# Patient Record
Sex: FEMALE | Race: WHITE | NOT HISPANIC OR LATINO
[De-identification: names, ages, dates, MRNs, and addresses within clinical notes are randomized per-mention and may not be internally consistent; named-entity substitution may affect disease eponyms.]

---

## 2017-06-22 ENCOUNTER — TRANSCRIPTION ENCOUNTER (OUTPATIENT)
Age: 42
End: 2017-06-22

## 2018-02-03 ENCOUNTER — APPOINTMENT (OUTPATIENT)
Dept: CT IMAGING | Facility: CLINIC | Age: 43
End: 2018-02-03
Payer: COMMERCIAL

## 2018-02-03 ENCOUNTER — OUTPATIENT (OUTPATIENT)
Dept: OUTPATIENT SERVICES | Facility: HOSPITAL | Age: 43
LOS: 1 days | End: 2018-02-03
Payer: COMMERCIAL

## 2018-02-03 DIAGNOSIS — Z00.8 ENCOUNTER FOR OTHER GENERAL EXAMINATION: ICD-10-CM

## 2018-02-03 PROCEDURE — 70486 CT MAXILLOFACIAL W/O DYE: CPT

## 2018-02-03 PROCEDURE — 70486 CT MAXILLOFACIAL W/O DYE: CPT | Mod: 26

## 2018-03-30 ENCOUNTER — OUTPATIENT (OUTPATIENT)
Dept: OUTPATIENT SERVICES | Facility: HOSPITAL | Age: 43
LOS: 1 days | End: 2018-03-30
Payer: COMMERCIAL

## 2018-03-30 VITALS
WEIGHT: 113.98 LBS | TEMPERATURE: 99 F | HEART RATE: 94 BPM | SYSTOLIC BLOOD PRESSURE: 97 MMHG | RESPIRATION RATE: 16 BRPM | HEIGHT: 62 IN | DIASTOLIC BLOOD PRESSURE: 67 MMHG | OXYGEN SATURATION: 98 %

## 2018-03-30 DIAGNOSIS — J32.0 CHRONIC MAXILLARY SINUSITIS: ICD-10-CM

## 2018-03-30 DIAGNOSIS — J34.3 HYPERTROPHY OF NASAL TURBINATES: ICD-10-CM

## 2018-03-30 DIAGNOSIS — J34.2 DEVIATED NASAL SEPTUM: ICD-10-CM

## 2018-03-30 DIAGNOSIS — Z01.818 ENCOUNTER FOR OTHER PREPROCEDURAL EXAMINATION: ICD-10-CM

## 2018-03-30 DIAGNOSIS — Z98.890 OTHER SPECIFIED POSTPROCEDURAL STATES: Chronic | ICD-10-CM

## 2018-03-30 LAB
APTT BLD: 29.9 SEC — SIGNIFICANT CHANGE UP (ref 27.5–37.4)
HCT VFR BLD CALC: 37.6 % — SIGNIFICANT CHANGE UP (ref 34.5–45)
HGB BLD-MCNC: 12.2 G/DL — SIGNIFICANT CHANGE UP (ref 11.5–15.5)
INR BLD: 1.01 RATIO — SIGNIFICANT CHANGE UP (ref 0.88–1.16)
MCHC RBC-ENTMCNC: 28.2 PG — SIGNIFICANT CHANGE UP (ref 27–34)
MCHC RBC-ENTMCNC: 32.4 GM/DL — SIGNIFICANT CHANGE UP (ref 32–36)
MCV RBC AUTO: 86.8 FL — SIGNIFICANT CHANGE UP (ref 80–100)
NRBC # BLD: 0 /100 WBCS — SIGNIFICANT CHANGE UP (ref 0–0)
PLATELET # BLD AUTO: 330 K/UL — SIGNIFICANT CHANGE UP (ref 150–400)
PROTHROM AB SERPL-ACNC: 11.4 SEC — SIGNIFICANT CHANGE UP (ref 10–13.1)
RBC # BLD: 4.33 M/UL — SIGNIFICANT CHANGE UP (ref 3.8–5.2)
RBC # FLD: 13.4 % — SIGNIFICANT CHANGE UP (ref 10.3–14.5)
WBC # BLD: 6.35 K/UL — SIGNIFICANT CHANGE UP (ref 3.8–10.5)
WBC # FLD AUTO: 6.35 K/UL — SIGNIFICANT CHANGE UP (ref 3.8–10.5)

## 2018-03-30 PROCEDURE — 85027 COMPLETE CBC AUTOMATED: CPT

## 2018-03-30 PROCEDURE — G0463: CPT

## 2018-03-30 PROCEDURE — 85610 PROTHROMBIN TIME: CPT

## 2018-03-30 PROCEDURE — 85730 THROMBOPLASTIN TIME PARTIAL: CPT

## 2018-03-30 RX ORDER — LIDOCAINE HCL 20 MG/ML
0.2 VIAL (ML) INJECTION ONCE
Qty: 0 | Refills: 0 | Status: DISCONTINUED | OUTPATIENT
Start: 2018-04-02 | End: 2018-04-17

## 2018-03-30 RX ORDER — SODIUM CHLORIDE 9 MG/ML
3 INJECTION INTRAMUSCULAR; INTRAVENOUS; SUBCUTANEOUS EVERY 8 HOURS
Qty: 0 | Refills: 0 | Status: DISCONTINUED | OUTPATIENT
Start: 2018-04-02 | End: 2018-04-17

## 2018-03-30 NOTE — H&P PST ADULT - HISTORY OF PRESENT ILLNESS
42 yr old female with deviated nasal septum, chronic nasal infections, hypertrophy of nasal turbinates, presents to PST for scheduled functional endoscopic sinus surgery, septoplasty, bilateral turbinectomy on 4/2/18. Denies fever, chills, no acute complaints.

## 2018-03-30 NOTE — H&P PST ADULT - PMH
Benign breast lumps    Chronic maxillary sinusitis    Deviated nasal septum, congenital    History of pneumonia  years ago  Hypertrophy of nasal turbinates    Seasonal allergies

## 2018-03-30 NOTE — H&P PST ADULT - PROBLEM SELECTOR PLAN 1
functional endoscopic sinus surgery, septoplasty, bilateral turbinectomy  PST instructions provided, patient verbalized understanding.   CBC, coags collected and send.   [facility] on admission .

## 2018-03-30 NOTE — H&P PST ADULT - NSANTHOSAYNRD_GEN_A_CORE
No. SARBJIT screening performed.  STOP BANG Legend: 0-2 = LOW Risk; 3-4 = INTERMEDIATE Risk; 5-8 = HIGH Risk

## 2018-03-30 NOTE — H&P PST ADULT - PRIMARY CARE PROVIDER
Dr. Les Bowie [phone] last visit about 6 month ago and cardiologist Dr. Les Bowie [phone] last visit about 6 month ago

## 2018-04-01 ENCOUNTER — TRANSCRIPTION ENCOUNTER (OUTPATIENT)
Age: 43
End: 2018-04-01

## 2018-04-01 RX ORDER — SODIUM CHLORIDE 9 MG/ML
1000 INJECTION, SOLUTION INTRAVENOUS
Qty: 0 | Refills: 0 | Status: DISCONTINUED | OUTPATIENT
Start: 2018-04-02 | End: 2018-04-17

## 2018-04-01 RX ORDER — CELECOXIB 200 MG/1
200 CAPSULE ORAL ONCE
Qty: 0 | Refills: 0 | Status: DISCONTINUED | OUTPATIENT
Start: 2018-04-02 | End: 2018-04-17

## 2018-04-01 RX ORDER — ONDANSETRON 8 MG/1
4 TABLET, FILM COATED ORAL ONCE
Qty: 0 | Refills: 0 | Status: DISCONTINUED | OUTPATIENT
Start: 2018-04-02 | End: 2018-04-17

## 2018-04-01 RX ORDER — OXYCODONE HYDROCHLORIDE 5 MG/1
5 TABLET ORAL ONCE
Qty: 0 | Refills: 0 | Status: DISCONTINUED | OUTPATIENT
Start: 2018-04-02 | End: 2018-04-02

## 2018-04-02 ENCOUNTER — RESULT REVIEW (OUTPATIENT)
Age: 43
End: 2018-04-02

## 2018-04-02 ENCOUNTER — OUTPATIENT (OUTPATIENT)
Dept: OUTPATIENT SERVICES | Facility: HOSPITAL | Age: 43
LOS: 1 days | End: 2018-04-02
Payer: COMMERCIAL

## 2018-04-02 VITALS
RESPIRATION RATE: 16 BRPM | HEART RATE: 102 BPM | OXYGEN SATURATION: 100 % | SYSTOLIC BLOOD PRESSURE: 108 MMHG | DIASTOLIC BLOOD PRESSURE: 68 MMHG

## 2018-04-02 VITALS
DIASTOLIC BLOOD PRESSURE: 60 MMHG | OXYGEN SATURATION: 100 % | HEART RATE: 87 BPM | SYSTOLIC BLOOD PRESSURE: 91 MMHG | TEMPERATURE: 98 F | RESPIRATION RATE: 16 BRPM | WEIGHT: 113.98 LBS | HEIGHT: 62 IN

## 2018-04-02 DIAGNOSIS — J34.2 DEVIATED NASAL SEPTUM: ICD-10-CM

## 2018-04-02 DIAGNOSIS — J32.0 CHRONIC MAXILLARY SINUSITIS: ICD-10-CM

## 2018-04-02 DIAGNOSIS — Z98.890 OTHER SPECIFIED POSTPROCEDURAL STATES: Chronic | ICD-10-CM

## 2018-04-02 DIAGNOSIS — J34.3 HYPERTROPHY OF NASAL TURBINATES: ICD-10-CM

## 2018-04-02 PROCEDURE — 88305 TISSUE EXAM BY PATHOLOGIST: CPT | Mod: 26

## 2018-04-02 PROCEDURE — 88300 SURGICAL PATH GROSS: CPT

## 2018-04-02 PROCEDURE — 30520 REPAIR OF NASAL SEPTUM: CPT

## 2018-04-02 PROCEDURE — 31256 EXPLORATION MAXILLARY SINUS: CPT | Mod: 50

## 2018-04-02 PROCEDURE — 88300 SURGICAL PATH GROSS: CPT | Mod: 26,59

## 2018-04-02 PROCEDURE — 30140 RESECT INFERIOR TURBINATE: CPT | Mod: 50

## 2018-04-02 PROCEDURE — 88305 TISSUE EXAM BY PATHOLOGIST: CPT

## 2018-04-02 PROCEDURE — 31254 NSL/SINS NDSC W/PRTL ETHMDCT: CPT | Mod: 50

## 2018-04-02 RX ORDER — FAMOTIDINE 10 MG/ML
1 INJECTION INTRAVENOUS
Qty: 0 | Refills: 0 | COMMUNITY

## 2018-04-02 RX ORDER — LORATADINE, PSEUDOEPHEDRINE SULFATE 5; 120 MG/1; MG/1
1 TABLET, FILM COATED, EXTENDED RELEASE ORAL
Qty: 0 | Refills: 0 | COMMUNITY

## 2018-04-02 NOTE — BRIEF OPERATIVE NOTE - PROCEDURE
<<-----Click on this checkbox to enter Procedure Endoscopic sinus surgery with nasal septoplasty and turbinectomy  04/02/2018    Active  DEIDRA

## 2018-04-02 NOTE — ASU PATIENT PROFILE, ADULT - ALCOHOL USE HISTORY SINGLE SELECT
October 24, 2019    [address]  [facility] [id number]      Dear Jessi Pay:    The following are the results of your recent tests. Please review the list of test results.   Your result is the value on the left; we have also suppl First Screen:          Haile Harvey                                                                    Rescreen:              Jyoti Chandler                                                               Specimen:     ThinPrep Imager Screening Pap, Cervical/end yes...

## 2018-04-02 NOTE — BRIEF OPERATIVE NOTE - POST-OP DX
Chronic sinusitis, unspecified location  04/02/2018    Rashid Leyva  Deviated nasal septum  04/02/2018    Rashid Leyva  Nasal turbinate hypertrophy  04/02/2018    Active  Rashid Bravo

## 2018-04-02 NOTE — ASU DISCHARGE PLAN (ADULT/PEDIATRIC). - NOTIFY
Fever greater than 101 Fever greater than 101/Pain not relieved by Medications/Bleeding that does not stop

## 2018-04-05 LAB — SURGICAL PATHOLOGY STUDY: SIGNIFICANT CHANGE UP

## 2019-09-16 ENCOUNTER — EMERGENCY (EMERGENCY)
Facility: HOSPITAL | Age: 44
LOS: 1 days | Discharge: HOME | End: 2019-09-16
Attending: STUDENT IN AN ORGANIZED HEALTH CARE EDUCATION/TRAINING PROGRAM
Payer: COMMERCIAL

## 2019-09-16 VITALS
HEIGHT: 62 IN | DIASTOLIC BLOOD PRESSURE: 63 MMHG | OXYGEN SATURATION: 96 % | HEART RATE: 98 BPM | RESPIRATION RATE: 16 BRPM | TEMPERATURE: 98 F | SYSTOLIC BLOOD PRESSURE: 109 MMHG | WEIGHT: 110.89 LBS

## 2019-09-16 VITALS
RESPIRATION RATE: 20 BRPM | OXYGEN SATURATION: 96 % | TEMPERATURE: 98 F | HEART RATE: 89 BPM | DIASTOLIC BLOOD PRESSURE: 79 MMHG | SYSTOLIC BLOOD PRESSURE: 105 MMHG

## 2019-09-16 DIAGNOSIS — Z98.890 OTHER SPECIFIED POSTPROCEDURAL STATES: Chronic | ICD-10-CM

## 2019-09-16 PROBLEM — N63.0 UNSPECIFIED LUMP IN UNSPECIFIED BREAST: Chronic | Status: ACTIVE | Noted: 2018-03-30

## 2019-09-16 PROBLEM — J34.3 HYPERTROPHY OF NASAL TURBINATES: Chronic | Status: ACTIVE | Noted: 2018-03-30

## 2019-09-16 PROBLEM — J30.2 OTHER SEASONAL ALLERGIC RHINITIS: Chronic | Status: ACTIVE | Noted: 2018-03-30

## 2019-09-16 PROBLEM — Z87.01 PERSONAL HISTORY OF PNEUMONIA (RECURRENT): Chronic | Status: ACTIVE | Noted: 2018-03-30

## 2019-09-16 PROBLEM — J32.0 CHRONIC MAXILLARY SINUSITIS: Chronic | Status: ACTIVE | Noted: 2018-03-30

## 2019-09-16 PROBLEM — Q67.4 OTHER CONGENITAL DEFORMITIES OF SKULL, FACE AND JAW: Chronic | Status: ACTIVE | Noted: 2018-03-30

## 2019-09-16 PROCEDURE — 99283 EMERGENCY DEPT VISIT LOW MDM: CPT

## 2019-09-16 NOTE — ED PROVIDER NOTE - PATIENT PORTAL LINK FT
You can access the FollowMyHealth Patient Portal offered by [facility] by registering at the following website: [email or web address]. By joining [facility]’s FollowMyHealth portal, you will also be able to view your health information using other applications (apps) compatible with our system.

## 2019-09-16 NOTE — ED ADULT NURSE NOTE - OBJECTIVE STATEMENT
since yesterday afternoon - becoming progressively worse . sneezing, doubled up on claritin, sore throat took dayquil - partial relief. overnight turned into congestion, HA - pressure.     +sick contacts.   denies recent travel.     works in school    anxious.    septal plasty, cant sleep.     1130 ibuprofen at home.     fever at home. 43 y.o F with PMH of septal plasty, pneumonia, sinusitis, presents to the ED c.o HA, nasal congestion, sore throat, and chills x1 day. Pt. reports symptoms presented yesterday afternoon and have been progressively worsening. States she has been sneezing, doubled up on Claritin because she thought it was her allergies, but symptoms did not resolve. Pt. endorses decreased PO due to sore throat. Pt. states head pressure and congestion worsened over night. Reports she was unable to sleep due to discomfort and anxiety.  Pt. works in a school. + sick contacts. Denies recent travel. Believes she had fever at home but did not take temperature. Afebrile at this time. Last took ibuprofen at 2330. Pt. speaking in full coherent sentences. Sating at 96% on RA. Denies dizziness, nausea, vomiting, diarrhea, urinary symptoms and CP at this time. safety and comfort provided. Family at bedside.

## 2019-09-16 NOTE — ED PROVIDER NOTE - NSFOLLOWUPINSTRUCTIONS_ED_ALL_ED_FT
We believe you have an acute viral infection if you are still having persistent signs and symptoms after 7 days you may take the antibiotics as prescribed    Please return to the ER for any concerning signs or symptoms.

## 2019-09-16 NOTE — ED PROVIDER NOTE - NS ED ROS FT
REVIEW OF SYSTEMS:  General:  no fever, no chills  HEENT: +Congestion, runny nose. no vision changes  Cardiac: no chest pain, no palpitations  Respiratory: no cough, no shortness of breath  Gastrointestinal: no abdominal pain, no nausea, no vomiting, no diarrhea  Genitourinary: no hematuria, no dysuria, no urinary frequency, no urinary hesitancy   Extremities: no extremity swelling, no extremity pain  Neuro: no focal weakness, no numbness/tingling of the extremities, no decreased sensation  Heme: no easy bleeding, no easy bruising, no anemia  Skin: no abrasions, no jaundice, no pruritis, no rashes, no lesions  -Noman Marroquin, PGY-2

## 2019-09-16 NOTE — ED PROVIDER NOTE - OBJECTIVE STATEMENT
43F pmh chronic sinusitis, s/p septoplasty >1 yr ago w/ improvement of sx p/w 1 days of nasal congestion. Pt states she has had very bad nasal congestion and cannot breath through her nose. Reports thick nasal discharge. Had fevers at home. Took nyquil w/ relief of fevers but still having bad nasal congestion. No difficulty breathing, facial pain, changes in vision.

## 2019-09-16 NOTE — ED PROVIDER NOTE - PHYSICAL EXAMINATION
General: Well developed, well nourished  HEENT: Normocephalic and atraumatic, uvula midline, tonsils nl, no tonsilar exudates. Nares patent. Trachea midline.   Cardiac: Normal S1 and S2 w/ RRR. No MRG.  Pulmonary: CTA bilaterally. No increased WOB.   Abdominal: Soft, NTND  Neurologic: No focal sensory or motor deficits.  Musculoskeletal: No limited ROM.  Vascular: Warm and well perfused  Skin: Color appropriate for race.   Psychiatric: Appropriate mood and affect. No apparent risk to self or others.  Noman Marroquin, PGY-2

## 2019-09-16 NOTE — ED PROVIDER NOTE - ATTENDING CONTRIBUTION TO CARE
44 F p/w requesting antibiotics for 1 day of nasal congestion. pt states that flonase, mucinex, claritin-d, sudafed, nasal saline, do not help with her symptoms. She states that only doxycyline which she has had in the past for these symptoms works. Pt reports trying nyquil for symptoms but it is not helping. She has no facial pain she reports that she had a septoplasty between 1-2 years ago and since has not had any sinus infections. Pt states "I know my body and I need antibiotics". Pt has had no recent hospitalizations. She is not immunocompromised. She works as a psychologist.  She is accompanied with her daughter.   On exam, pt is well appearing. she has mild swelling of the bilateral nasal turbinates, she has no maxillary sinus or frontal sinus pain. She has no tenderness w/ manipulation of the ears, posterior pharynx w/ mild erythema, she has no exudates. Pt has clear lungs her heart is regular rate and rhythm and she has 2+ radial pulses.   Pt likely has a viral infection. Rhinosinuitis likely viral.   When pt was counseled on likely viral infection and the importance of supportive care, she stated that "I am 43 years old I didn't come to the ER to be told I should take flonase which doesn't work" "I will educate you on the medications I need" Pt was informed of the IDSA guidelines. She was also counseled on antibiotic resistance however the patient continues to state "don't tell me about antibiotic resistance, I know when I need antibiotics". Pt was offered a script of doxycycline and was counselled to start this medication if she develops sinus pain for more than 3 days, if she develops continued nasal congestion for beyond 10 days. Pt was told to discuss doxycycline side effects with her pharmacist if she chooses to fill the prescription.

## 2019-09-16 NOTE — ED ADULT NURSE NOTE - NSIMPLEMENTINTERV_GEN_ALL_ED
Implemented All Universal Safety Interventions:  [place] to call system. Call bell, personal items and telephone within reach. Instruct patient to call for assistance. Room bathroom lighting operational. Non-slip footwear when patient is off stretcher. Physically safe environment: no spills, clutter or unnecessary equipment. Stretcher in lowest position, wheels locked, appropriate side rails in place.

## 2019-12-01 ENCOUNTER — TRANSCRIPTION ENCOUNTER (OUTPATIENT)
Age: 44
End: 2019-12-01

## 2020-07-01 ENCOUNTER — RESULT REVIEW (OUTPATIENT)
Age: 45
End: 2020-07-01

## 2021-08-29 ENCOUNTER — APPOINTMENT (OUTPATIENT)
Dept: MRI IMAGING | Facility: CLINIC | Age: 46
End: 2021-08-29
Payer: COMMERCIAL

## 2021-08-29 ENCOUNTER — OUTPATIENT (OUTPATIENT)
Dept: OUTPATIENT SERVICES | Facility: HOSPITAL | Age: 46
LOS: 1 days | End: 2021-08-29
Payer: COMMERCIAL

## 2021-08-29 DIAGNOSIS — Z00.8 ENCOUNTER FOR OTHER GENERAL EXAMINATION: ICD-10-CM

## 2021-08-29 DIAGNOSIS — Z98.890 OTHER SPECIFIED POSTPROCEDURAL STATES: Chronic | ICD-10-CM

## 2021-08-29 DIAGNOSIS — R29.810 FACIAL WEAKNESS: ICD-10-CM

## 2021-08-29 PROCEDURE — 70551 MRI BRAIN STEM W/O DYE: CPT | Mod: 26

## 2021-08-29 PROCEDURE — 70551 MRI BRAIN STEM W/O DYE: CPT

## 2022-01-28 ENCOUNTER — APPOINTMENT (OUTPATIENT)
Dept: PULMONOLOGY | Facility: CLINIC | Age: 47
End: 2022-01-28
Payer: COMMERCIAL

## 2022-01-28 DIAGNOSIS — U07.1 COVID-19: ICD-10-CM

## 2022-01-28 DIAGNOSIS — Z87.09 PERSONAL HISTORY OF OTHER DISEASES OF THE RESPIRATORY SYSTEM: ICD-10-CM

## 2022-01-28 PROCEDURE — 99202 OFFICE O/P NEW SF 15 MIN: CPT | Mod: 95

## 2022-01-28 NOTE — HISTORY OF PRESENT ILLNESS
[Home] : at home, [unfilled] , at the time of the visit. [Medical Office: ([facility])___] : at the medical office located in  [Verbal consent obtained from patient] : the patient, [unfilled] [FreeTextEntry1] : 46-year-old female tested positive for COVID 12 days ago. The patient is fully vaccinated and had COVID in March of 2020. She feels reasonably well. She complains of early morning cough that sometimes is productive and coughing after talking. She has a fluttering sensation in her chest but no actual chest pain or dyspnea. Her oxygen saturations have always been 96-98% range. She is a lifelong nonsmoker. 15 years ago she had pneumonia that was diagnosed by x-ray but she was not hospitalized and she was adequately treated with no sequelae. She does have chronic sinusitis and has had sinus surgery on at least one occasion.\par she is a never smoker

## 2022-01-28 NOTE — DISCUSSION/SUMMARY
[FreeTextEntry1] : I have ordered a chest x-ray with the patient and depending on the findings will dictate followup

## 2022-01-31 ENCOUNTER — OUTPATIENT (OUTPATIENT)
Dept: OUTPATIENT SERVICES | Facility: HOSPITAL | Age: 47
LOS: 1 days | End: 2022-01-31
Payer: COMMERCIAL

## 2022-01-31 ENCOUNTER — APPOINTMENT (OUTPATIENT)
Dept: RADIOLOGY | Facility: CLINIC | Age: 47
End: 2022-01-31
Payer: COMMERCIAL

## 2022-01-31 DIAGNOSIS — U07.1 COVID-19: ICD-10-CM

## 2022-01-31 DIAGNOSIS — Z98.890 OTHER SPECIFIED POSTPROCEDURAL STATES: Chronic | ICD-10-CM

## 2022-01-31 PROCEDURE — 71046 X-RAY EXAM CHEST 2 VIEWS: CPT

## 2022-01-31 PROCEDURE — 71046 X-RAY EXAM CHEST 2 VIEWS: CPT | Mod: 26

## 2022-02-18 ENCOUNTER — NON-APPOINTMENT (OUTPATIENT)
Age: 47
End: 2022-02-18

## 2023-12-18 ENCOUNTER — APPOINTMENT (OUTPATIENT)
Dept: PODIATRY | Facility: CLINIC | Age: 48
End: 2023-12-18
Payer: COMMERCIAL

## 2023-12-18 ENCOUNTER — TRANSCRIPTION ENCOUNTER (OUTPATIENT)
Age: 48
End: 2023-12-18

## 2023-12-18 DIAGNOSIS — M79.674 PAIN IN RIGHT TOE(S): ICD-10-CM

## 2023-12-18 DIAGNOSIS — S92.501A DISPLACED UNSPECIFIED FRACTURE OF RIGHT LESSER TOE(S), INITIAL ENCOUNTER FOR CLOSED FRACTURE: ICD-10-CM

## 2023-12-18 PROCEDURE — 73630 X-RAY EXAM OF FOOT: CPT | Mod: RT

## 2023-12-18 PROCEDURE — 28510 TREATMENT OF TOE FRACTURE: CPT | Mod: T7

## 2023-12-18 PROCEDURE — 99203 OFFICE O/P NEW LOW 30 MIN: CPT | Mod: 25

## 2023-12-20 PROBLEM — S92.501A: Status: ACTIVE | Noted: 2023-12-19

## 2023-12-20 PROBLEM — M79.674 PAIN OF TOE OF RIGHT FOOT: Status: ACTIVE | Noted: 2023-12-19

## 2023-12-20 NOTE — PROCEDURE
[FreeTextEntry1] : X-rays were taken to evaluate for fracture. X-ray Report: (Right foot - 3 views) X-rays demonstrate fracture, intra-articular medially at the distal phalanx at the DIPJ, nondisplaced.

## 2023-12-20 NOTE — ASSESSMENT
[FreeTextEntry1] : Impression: Acute fracture at the right 3rd toe.  Treatment: I andreas splinted the toe. She will leave it on for 3 to 5 days and I gave her supplies to continue to splint for 3 weeks.  I discussed wearing comfortable shoe gear, avoiding heels. She could follow-up in 3 weeks with we will make a determination as to treatment going forward.

## 2023-12-20 NOTE — HISTORY OF PRESENT ILLNESS
[FreeTextEntry1] : Patient presents today for evaluation and care of pain at the right 3rd toe. She stubbed it on a vacuum  about a week ago. It is getting more swollen and more painful. It is interfering with her lifestyle.